# Patient Record
Sex: MALE | Race: WHITE | Employment: FULL TIME | URBAN - METROPOLITAN AREA
[De-identification: names, ages, dates, MRNs, and addresses within clinical notes are randomized per-mention and may not be internally consistent; named-entity substitution may affect disease eponyms.]

---

## 2017-11-14 ENCOUNTER — HOSPITAL ENCOUNTER (EMERGENCY)
Facility: CLINIC | Age: 42
Discharge: HOME | End: 2017-11-14
Attending: EMERGENCY MEDICINE | Admitting: EMERGENCY MEDICINE
Payer: COMMERCIAL

## 2017-11-14 VITALS
OXYGEN SATURATION: 98 % | BODY MASS INDEX: 28.56 KG/M2 | TEMPERATURE: 98.4 F | SYSTOLIC BLOOD PRESSURE: 154 MMHG | WEIGHT: 204 LBS | RESPIRATION RATE: 18 BRPM | DIASTOLIC BLOOD PRESSURE: 100 MMHG | HEIGHT: 71 IN

## 2017-11-14 DIAGNOSIS — F32.A DEPRESSION, UNSPECIFIED DEPRESSION TYPE: ICD-10-CM

## 2017-11-14 DIAGNOSIS — F41.9 ANXIETY: ICD-10-CM

## 2017-11-14 PROCEDURE — 99283 EMERGENCY DEPT VISIT LOW MDM: CPT

## 2017-11-14 PROCEDURE — 25000132 ZZH RX MED GY IP 250 OP 250 PS 637: Performed by: EMERGENCY MEDICINE

## 2017-11-14 RX ORDER — LORAZEPAM 2 MG/1
2 TABLET ORAL ONCE
Status: COMPLETED | OUTPATIENT
Start: 2017-11-14 | End: 2017-11-14

## 2017-11-14 RX ORDER — LORAZEPAM 0.5 MG/1
1 TABLET ORAL ONCE
Status: DISCONTINUED | OUTPATIENT
Start: 2017-11-14 | End: 2017-11-14

## 2017-11-14 RX ORDER — CLONAZEPAM 1 MG/1
0.5-1 TABLET ORAL 2 TIMES DAILY PRN
Qty: 20 TABLET | Refills: 0 | Status: SHIPPED | OUTPATIENT
Start: 2017-11-14

## 2017-11-14 RX ADMIN — LORAZEPAM 2 MG: 2 TABLET ORAL at 13:46

## 2017-11-14 ASSESSMENT — ENCOUNTER SYMPTOMS
SLEEP DISTURBANCE: 1
DECREASED CONCENTRATION: 1
NERVOUS/ANXIOUS: 1

## 2017-11-14 NOTE — ED AVS SNAPSHOT
Emergency Department    [address] [facility] [address]    Phone:  [phone]    Fax:  [phone]                                       Spencer Kiser   MRN: [id number]    Department:   Emergency Department   Date of Visit:  11/14/2017           After Visit Summary Signature Page     I have received my discharge instructions, and my questions have been answered. I have discussed any challenges I see with this plan with the nurse or doctor.    ..........................................................................................................................................  Patient/Patient Representative Signature      ..........................................................................................................................................  Patient Representative Print Name and Relationship to Patient    ..................................................               ................................................  Date                                            Time    ..........................................................................................................................................  Reviewed by Signature/Title    ...................................................              ..............................................  Date                                                            Time

## 2017-11-14 NOTE — ED PROVIDER NOTES
"  History     Chief Complaint:  Depression     HPI   Spencer Kiser is a 42 year old male with a history of anxiety and depression who presents to the emergency department today accompanied by his wife for evaluation of depression. The patient states he has been experiencing \"bad anxiety.\" He has been on Seroquel for 10 years and this normally helps him fall asleep, but it has not provided any relief  recently. He has been experiencing loss of sleep, loss of motivation, and loss of concentration. He says he \"thinks about suicide but would never do it\" and has no active plan. He regularly sees a psychiatric nurse practitioner for depression management. Within the past month, he had switched medications for depression and anxiety 3-4 times due to insurance problems, but has noticed no relief on the new medications.  He could not recall the name of the medication he is currently on, but states he has been taking it for 2 months. Within the past 1.5 years, he had moved to [place] from [place], started a new job, and experienced many stressors in his personal life.     Allergies:  Drug allergies reviewed. No pertinent drug allergies.     Medications:    Seroquel  Depression medications     Past Medical History:    Depression  Anxiety    Past Surgical History:    Past surgical history reviewed. No pertinent past surgical hist     Family History:    Family history reviewed. No pertinent family history.     Social History:  The patient was accompanied to the ED by wife.  Marital Status:     Review of Systems   Psychiatric/Behavioral: Positive for decreased concentration, sleep disturbance and suicidal ideas. The patient is nervous/anxious.    All other systems reviewed and are negative.    Physical Exam     Patient Vitals for the past 24 hrs:   BP Temp Temp src Heart Rate Resp SpO2 Height Weight   11/14/17 1222 (!) 154/100 98.4  F (36.9  C) Temporal 81 18 98 % 1.803 m (5' 11\") 92.5 kg (204 lb)     Physical " Exam  General/Appearance: appears stated age, well-groomed, appears comfortable  Eyes: EOMI, no scleral injection, no icterus  ENT: MMM  Neck: supple, nl ROM, no stiffness  Cardiovascular: RRR, nl S1S2, no m/r/g, 2+ pulses in all 4 extremities, cap refill <2sec  Respiratory: CTAB, good air movement throughout, no wheezes/rhonchi/rales, no increased WOB, no retractions  Back: no lesions  GI: abd soft, non-distended, nttp,  no HSM, no rebound, no guarding, nl BS  MSK: KOHLI, good tone, no bony abnormality  Skin: warm and well-perfused, no rash, no edema, no ecchymosis, nl turgor  Neuro: GCS 15, alert and oriented, no gross focal neuro deficits  Psych: Appearance: Casually dressed, appears stated age.     Attitude: Cooperative.     Eye Contact: Fair.     Speech: Regular rate rhythm normal volume and tone    Psychomotor Behavior: Normal    Mood: depressed and anxious    Affect: mildly flat    Thought Process: Intact    Thought Content: passive SI. - HI. - paranoia. - hallucinations    Insight: Intact    Judgment: Intact     Oriented to: Person place and time.     Attention Span and Concentration: Intact     Recent and Remote Memory: Intact  Heme: no petechia, no purpura, no active bleeding    Emergency Department Course     Interventions:  1346 Ativan 2 mg PO     Emergency Department Course:    Nursing notes and vitals reviewed.    1310 I performed an exam of the patient as documented above.     I discussed the treatment plan with the patient and his wife. They expressed understanding of this plan and consented to discharge. They will be discharged home with instructions for care and follow up. In addition, the patient will return to the emergency department if their symptoms persist, worsen, if new symptoms arise or if there is any concern.  All questions were answered.     Impression & Plan      Medical Decision Making:  Spencer Kiser is a 42 year old male who presents to the emergency department today for  evaluation of increasing anxiety and depression. Multiple medications have been changed recently in the new ones, Zyprexa, are not working as well. It turns out that initially he reported that he is on Seroquel every night but it turns out he is only taking half of the Seroquel tablet as he has to pay out of pocket for it. I suspect this lack of sleep is definitely contributing to his worsening mood. He already has Xanax, that he was able to inform us later however I will supply a few clonazepam to see if that helps even out his anxiety. Although he's has passive suicidal thoughts he has no active thoughts or ideation. I feel that he is currently safe for discharge and outpatient management. He has a therapy appointment set up for tomorrow. Both I and the nurse encouraged him to call to see one of the psychiatrists to continue to trial different medications.   Diagnosis:    ICD-10-CM    1. Depression, unspecified depression type F32.9    2. Anxiety F41.9      Disposition:   The patient is discharged to home.     Discharge Medications:  New Prescriptions    CLONAZEPAM (KLONOPIN) 1 MG TABLET    Take 0.5-1 tablets (0.5-1 mg) by mouth 2 times daily as needed for anxiety     Scribe Disclosure:  I, Marie Matthews, am serving as a scribe at 1:13 PM on 11/14/2017 to document services personally performed by Dorcas Solis MD, based on my observations and the provider's statements to me.       EMERGENCY DEPARTMENT       Dorcas Solis MD  11/14/17 [id number]

## 2017-11-14 NOTE — DISCHARGE INSTRUCTIONS
Treating Anxiety Disorders with Therapy    If you have an anxiety disorder, you don t have to suffer anymore. Treatment is available. Therapy (also called counseling) is often a helpful treatment for anxiety disorders. With therapy, a specially trained professional (therapist) helps you face and learn to manage your anxiety. Therapy can be short-term or long-term depending on your needs. In some cases, medicine may also be prescribed with therapy. It may take time before you notice how much therapy is helping, but stick with it. With therapy, you can feel better.  Cognitive behavioral therapy (CBT)  Cognitive behavioral therapy (CBT) teaches you to manage anxiety. It does this by helping you understand how you think and act when you re anxious. Research has shown CBT to be a very effective treatment for anxiety disorders. How CBT is run is almost like a class. It involves homework and activities to build skills that teach you to cope with anxiety step by step. It can be done in a group or one-on-one, and often takes place for a set number of sessions. CBT has two main parts:    Cognitive therapy helps you identify the negative, irrational thoughts that occur with your anxiety. You ll learn to replace these with more positive, realistic thoughts.    Behavioral therapy helps you change how you react to anxiety. You ll learn coping skills and methods for relaxing to help you better deal with anxiety.  Other forms of therapy  Other therapy methods may work better for you than CBT. Or, you may move from CBT to another form of therapy as your treatment needs change. This may mean meeting with a therapist by yourself or in a group. Therapy can also help you work through problems in your life, such as drug or alcohol dependence, that may be making your anxiety worse.  Getting better takes time  Therapy will help you feel better and teach you skills to help manage anxiety long term. But change doesn t happen right away. It  takes a commitment from you. And treatment only works if you learn to face the causes of your anxiety. So, you might feel worse before you feel better. This can sometimes make it hard to stick with it. But remember: Therapy is a very effective treatment. The results will be well worth it.  Helping yourself  If anxiety is wearing you down, here are some things you can do to cope:    Check with your doctor and rule out any physical problems that may be causing the anxiety symptoms.    If an anxiety disorder is diagnosed, seek mental healthcare. This is an illness and it can respond to treatment. Most types of anxiety disorders will respond to talk therapy and medicine.    Educate yourself about anxiety disorders. Keep track of helpful online resources and books you can use during stressful periods.    Try stress management techniques such as meditation.    Consider online or in-person support groups.    Don t fight your feelings. Anxiety feeds itself. The more you worry about it, the worse it gets. Instead, try to identify what might have triggered your anxiety. Then try to put this threat in perspective.    Keep in mind that you can t control everything about a situation. Change what you can and let the rest take its course.    Exercise -- it s a great way to relieve tension and help your body feel relaxed.    Examine your life for stress, and try to find ways to reduce it.    Avoid caffeine and nicotine, which can make anxiety symptoms worse.    Fight the temptation to turn to alcohol or unprescribed drugs for relief. They only make things worse in the long run.   Date Last Reviewed: 1/1/2017 2000-2017 [employer]. [address] [facility], [place] [address]. All rights reserved. This information is not intended as a substitute for professional medical care. Always follow your healthcare professional's instructions.          Treating Anxiety Disorders with Medicine  An anxiety disorder can make you  feel nervous or apprehensive, even without a clear reason. In people age 65 and older, generalized anxiety disorder is one of the most commonly diagnosed anxiety disorders. Many times it occurs with depression. Certain anxiety disorders can cause intense feelings of fear or panic. You may even have physical symptoms such as a racing heartbeat, sweating, or dizziness. If you have these feelings, you don t have to suffer anymore. Treatment to help you overcome your fears will likely include therapy (also called counseling). Medicine may also be prescribed to help control your symptoms.    Medicines  Certain medicines may be prescribed to help control your symptoms. So you may feel less anxious. You may also feel able to move forward with therapy. At first, medicines and dosages may need to be adjusted to find what works best for you. Try to be patient. Tell your healthcare provider how a medicine makes you feel. This way, you can work together to find the treatment that s best for you. Keep in mind that medicines can have side effects. Talk with your provider about any side effects that are bothering you. Changing the dose or type of medicine may help. Don t stop taking medicine on your own. That can cause symptoms to come back.    Anti-anxiety medicine. This medicine eases symptoms and helps you relax. Your healthcare provider will explain when and how to use it. It may be prescribed for use before situations that make you anxious. You may also be told to take medicine on a regular schedule. Anti-anxiety medicine may make you feel a little sleepy or  out of it.  Don t drive a car or operate machinery while on this medicine, until you know how it affects you.  Caution  Never use alcohol or other drugs with anti-anxiety medicines. This could result in loss of muscular control, sedation, coma, or death. Also, use only the amount of medicine prescribed for you. If you think you may have taken too much, get emergency care  right away.     Antidepressant medicine. This kind of medicine is often used to treat anxiety, even if you aren t depressed. An antidepressant helps balance out brain chemicals. This helps keep anxiety under control. This medicine is taken on a schedule. It takes a few weeks to start working. If you don t notice a change at first, you may just need more time. But if you don t notice results after the first few weeks, tell your provider.  Keep taking medicines as prescribed  Never change your dosage, share or use another person's medicine, or stop taking your medicines without talking to your healthcare provider first. Keep the following in mind:    Some medicines must be taken on a schedule. Make this part of your daily routine. For instance, always take your pill before brushing your teeth. A pillbox can help you remember if you ve taken your medicine each day.    Medicines are often taken for 6 to 12 months. Your healthcare provider will then evaluate whether you need to stay on them. Many people who have also had therapy may no longer need medicine to manage anxiety.    You may need to stop taking medicine slowly to give your body time to adjust. When it s time to stop, your healthcare provider will tell you more. Remember: Never stop taking your medicine without talking to your provider first.    If symptoms return, you may need to start taking medicines again. This isn t your fault. It s just the nature of your anxiety disorder.  Special concerns    Side effects. Medicines may cause side effects. Ask your healthcare provider or pharmacist what you can expect. They may have ideas for avoiding some side effects.    Sexual problems. Some antidepressants can affect your desire for sex or your ability to have an orgasm. A change in dosage or medicine often solves the problem. If you have a sexual side effect that concerns you, tell your healthcare provider.    Addiction. If you ve never had a problem with drugs or  alcohol, you may not have a problem with medicines used to treat anxiety disorders. But always discuss the medicines with your healthcare provider before taking them. If you have a history of addiction, you may not be able to use certain medicines used to treat anxiety disorders.    Medicine interactions. Always check with your pharmacist before using any over-the-counter medicines, including herbal supplements.   Date Last Reviewed: 5/1/2017 2000-2017 [employer]. [address] [place] [address], [place] [address]. All rights reserved. This information is not intended as a substitute for professional medical care. Always follow your healthcare professional's instructions.          Depression  Depression is one of the most common mental health problems today. It is not just a state of unhappiness or sadness. It is a true disease. The cause seems to be related to a decrease in chemicals that transmit signals in the brain. Having a family history of depression, alcoholism, or suicide increases the risk. Chronic illness, chronic pain, migraine headaches and high emotional stress also increase the risk.  Depression is something we tend to recognize in others, but may have a hard time seeing in ourselves. It can show in many physical and emotional ways:    Loss of appetite    Over-eating    Not being able to sleep    Sleeping too much    Tiredness not related to physical exertion    Restlessness or irritability    Slowness of movement or speech    Feeling depressed or withdrawn    Loss of interest in things you once enjoyed    Trouble concentrating, poor memory, trouble making decisions    Thoughts of harming or killing oneself, or thoughts that life is not worth living    Low self-esteem  The treatment for depression may include both medicine and psychotherapy. Antidepressants can reduce suffering and can improve the ability to function during the depressed period. Therapy can offer emotional support and help  you understand emotional factors that may be causing the depression.  Home care    On-going care and support helps people manage this disease.  Find a healthcare provider and therapist who meet your needs. Seek help when you feel like you may be getting ill.    Be kind to yourself. Make it a point to do things that you enjoy (gardening, walking in nature, going to a movie, etc.). Reward yourself for small successes.    Take care of your physical body. Eat a balanced diet (low in saturated fat and high in fruits and vegetables). Exercise at least 3 times a week for 30 minutes. Even mild-moderate exercise (like brisk walking) can make you feel better.    Avoid alcohol, which can make depression worse.    Take medicine as prescribed.    Tell each of your healthcare providers about all of the prescription drugs, over-the-counter medicines, vitamins, and supplements you take. Certain supplements interact with medicines and can result in dangerous side effects. Ask your pharmacist when you have questions about drug interactions.    Talk with your family and trusted friends about your feelings and thoughts. Ask them to help you recognize behavior changes early so you can get help and, if needed, medicine can be adjusted.  Follow-up care  Follow up with your healthcare provider, or as advised.  Call 911  Call 911 if you:    Have suicidal thoughts, a suicide plan, and the means to carry out the plan    Have trouble breathing    Are very confused    Feel very drowsy or have trouble awakening    Faint or lose consciousness    Have new chest pain that becomes more severe, lasts longer, or spreads into your shoulder, arm, neck, jaw or back  When to seek medical advice  Call your healthcare provider right away if any of these occur:    Feeling extreme depression, fear, anxiety, or anger toward yourself or others    Feeling out of control    Feeling that you may try to harm yourself or another    Hearing voices that others do not  hear    Seeing things that others do not see    Can t sleep or eat for 3 days in a row    Friends or family express concern over your behavior and ask you to seek help  Date Last Reviewed: 9/29/2015 2000-2017 [employer]. [address] [place] [address], [place] [address]. All rights reserved. This information is not intended as a substitute for professional medical care. Always follow your healthcare professional's instructions.

## 2017-11-14 NOTE — ED AVS SNAPSHOT
Emergency Department    [address] [facility] [address]    Phone:  [phone]    Fax:  [phone]                                       Spencer Kiser   MRN: [id number]    Department:   Emergency Department   Date of Visit:  11/14/2017           Patient Information     Date Of Birth          1975        Your diagnoses for this visit were:     Depression, unspecified depression type     Anxiety        You were seen by Dorcas Solis MD.      Follow-up Information     Follow up with Follow-up with your Psychiatry office and Psychologist .        Follow up with  Emergency Department.    Specialty:  EMERGENCY MEDICINE    Why:  As needed, If symptoms worsen    Contact information:    [address] [facility] [address] [phone]        Discharge Instructions         Treating Anxiety Disorders with Therapy    If you have an anxiety disorder, you don t have to suffer anymore. Treatment is available. Therapy (also called counseling) is often a helpful treatment for anxiety disorders. With therapy, a specially trained professional (therapist) helps you face and learn to manage your anxiety. Therapy can be short-term or long-term depending on your needs. In some cases, medicine may also be prescribed with therapy. It may take time before you notice how much therapy is helping, but stick with it. With therapy, you can feel better.  Cognitive behavioral therapy (CBT)  Cognitive behavioral therapy (CBT) teaches you to manage anxiety. It does this by helping you understand how you think and act when you re anxious. Research has shown CBT to be a very effective treatment for anxiety disorders. How CBT is run is almost like a class. It involves homework and activities to build skills that teach you to cope with anxiety step by step. It can be done in a group or one-on-one, and often takes place for a set number of sessions. CBT has two main parts:    Cognitive  therapy helps you identify the negative, irrational thoughts that occur with your anxiety. You ll learn to replace these with more positive, realistic thoughts.    Behavioral therapy helps you change how you react to anxiety. You ll learn coping skills and methods for relaxing to help you better deal with anxiety.  Other forms of therapy  Other therapy methods may work better for you than CBT. Or, you may move from CBT to another form of therapy as your treatment needs change. This may mean meeting with a therapist by yourself or in a group. Therapy can also help you work through problems in your life, such as drug or alcohol dependence, that may be making your anxiety worse.  Getting better takes time  Therapy will help you feel better and teach you skills to help manage anxiety long term. But change doesn t happen right away. It takes a commitment from you. And treatment only works if you learn to face the causes of your anxiety. So, you might feel worse before you feel better. This can sometimes make it hard to stick with it. But remember: Therapy is a very effective treatment. The results will be well worth it.  Helping yourself  If anxiety is wearing you down, here are some things you can do to cope:    Check with your doctor and rule out any physical problems that may be causing the anxiety symptoms.    If an anxiety disorder is diagnosed, seek mental healthcare. This is an illness and it can respond to treatment. Most types of anxiety disorders will respond to talk therapy and medicine.    Educate yourself about anxiety disorders. Keep track of helpful online resources and books you can use during stressful periods.    Try stress management techniques such as meditation.    Consider online or in-person support groups.    Don t fight your feelings. Anxiety feeds itself. The more you worry about it, the worse it gets. Instead, try to identify what might have triggered your anxiety. Then try to put this threat  in perspective.    Keep in mind that you can t control everything about a situation. Change what you can and let the rest take its course.    Exercise -- it s a great way to relieve tension and help your body feel relaxed.    Examine your life for stress, and try to find ways to reduce it.    Avoid caffeine and nicotine, which can make anxiety symptoms worse.    Fight the temptation to turn to alcohol or unprescribed drugs for relief. They only make things worse in the long run.   Date Last Reviewed: 1/1/2017 2000-2017 [employer]. [address] [place] [address]. All rights reserved. This information is not intended as a substitute for professional medical care. Always follow your healthcare professional's instructions.          Treating Anxiety Disorders with Medicine  An anxiety disorder can make you feel nervous or apprehensive, even without a clear reason. In people age 65 and older, generalized anxiety disorder is one of the most commonly diagnosed anxiety disorders. Many times it occurs with depression. Certain anxiety disorders can cause intense feelings of fear or panic. You may even have physical symptoms such as a racing heartbeat, sweating, or dizziness. If you have these feelings, you don t have to suffer anymore. Treatment to help you overcome your fears will likely include therapy (also called counseling). Medicine may also be prescribed to help control your symptoms.    Medicines  Certain medicines may be prescribed to help control your symptoms. So you may feel less anxious. You may also feel able to move forward with therapy. At first, medicines and dosages may need to be adjusted to find what works best for you. Try to be patient. Tell your healthcare provider how a medicine makes you feel. This way, you can work together to find the treatment that s best for you. Keep in mind that medicines can have side effects. Talk with your provider about any side effects that are  bothering you. Changing the dose or type of medicine may help. Don t stop taking medicine on your own. That can cause symptoms to come back.    Anti-anxiety medicine. This medicine eases symptoms and helps you relax. Your healthcare provider will explain when and how to use it. It may be prescribed for use before situations that make you anxious. You may also be told to take medicine on a regular schedule. Anti-anxiety medicine may make you feel a little sleepy or  out of it.  Don t drive a car or operate machinery while on this medicine, until you know how it affects you.  Caution  Never use alcohol or other drugs with anti-anxiety medicines. This could result in loss of muscular control, sedation, coma, or death. Also, use only the amount of medicine prescribed for you. If you think you may have taken too much, get emergency care right away.     Antidepressant medicine. This kind of medicine is often used to treat anxiety, even if you aren t depressed. An antidepressant helps balance out brain chemicals. This helps keep anxiety under control. This medicine is taken on a schedule. It takes a few weeks to start working. If you don t notice a change at first, you may just need more time. But if you don t notice results after the first few weeks, tell your provider.  Keep taking medicines as prescribed  Never change your dosage, share or use another person's medicine, or stop taking your medicines without talking to your healthcare provider first. Keep the following in mind:    Some medicines must be taken on a schedule. Make this part of your daily routine. For instance, always take your pill before brushing your teeth. A pillbox can help you remember if you ve taken your medicine each day.    Medicines are often taken for 6 to 12 months. Your healthcare provider will then evaluate whether you need to stay on them. Many people who have also had therapy may no longer need medicine to manage anxiety.    You may need to  stop taking medicine slowly to give your body time to adjust. When it s time to stop, your healthcare provider will tell you more. Remember: Never stop taking your medicine without talking to your provider first.    If symptoms return, you may need to start taking medicines again. This isn t your fault. It s just the nature of your anxiety disorder.  Special concerns    Side effects. Medicines may cause side effects. Ask your healthcare provider or pharmacist what you can expect. They may have ideas for avoiding some side effects.    Sexual problems. Some antidepressants can affect your desire for sex or your ability to have an orgasm. A change in dosage or medicine often solves the problem. If you have a sexual side effect that concerns you, tell your healthcare provider.    Addiction. If you ve never had a problem with drugs or alcohol, you may not have a problem with medicines used to treat anxiety disorders. But always discuss the medicines with your healthcare provider before taking them. If you have a history of addiction, you may not be able to use certain medicines used to treat anxiety disorders.    Medicine interactions. Always check with your pharmacist before using any over-the-counter medicines, including herbal supplements.   Date Last Reviewed: 5/1/2017 2000-2017 [employer]. [address] [place] [address]. All rights reserved. This information is not intended as a substitute for professional medical care. Always follow your healthcare professional's instructions.          Depression  Depression is one of the most common mental health problems today. It is not just a state of unhappiness or sadness. It is a true disease. The cause seems to be related to a decrease in chemicals that transmit signals in the brain. Having a family history of depression, alcoholism, or suicide increases the risk. Chronic illness, chronic pain, migraine headaches and high emotional stress also  increase the risk.  Depression is something we tend to recognize in others, but may have a hard time seeing in ourselves. It can show in many physical and emotional ways:    Loss of appetite    Over-eating    Not being able to sleep    Sleeping too much    Tiredness not related to physical exertion    Restlessness or irritability    Slowness of movement or speech    Feeling depressed or withdrawn    Loss of interest in things you once enjoyed    Trouble concentrating, poor memory, trouble making decisions    Thoughts of harming or killing oneself, or thoughts that life is not worth living    Low self-esteem  The treatment for depression may include both medicine and psychotherapy. Antidepressants can reduce suffering and can improve the ability to function during the depressed period. Therapy can offer emotional support and help you understand emotional factors that may be causing the depression.  Home care    On-going care and support helps people manage this disease.  Find a healthcare provider and therapist who meet your needs. Seek help when you feel like you may be getting ill.    Be kind to yourself. Make it a point to do things that you enjoy (gardening, walking in nature, going to a movie, etc.). Reward yourself for small successes.    Take care of your physical body. Eat a balanced diet (low in saturated fat and high in fruits and vegetables). Exercise at least 3 times a week for 30 minutes. Even mild-moderate exercise (like brisk walking) can make you feel better.    Avoid alcohol, which can make depression worse.    Take medicine as prescribed.    Tell each of your healthcare providers about all of the prescription drugs, over-the-counter medicines, vitamins, and supplements you take. Certain supplements interact with medicines and can result in dangerous side effects. Ask your pharmacist when you have questions about drug interactions.    Talk with your family and trusted friends about your feelings and  thoughts. Ask them to help you recognize behavior changes early so you can get help and, if needed, medicine can be adjusted.  Follow-up care  Follow up with your healthcare provider, or as advised.  Call 911  Call 911 if you:    Have suicidal thoughts, a suicide plan, and the means to carry out the plan    Have trouble breathing    Are very confused    Feel very drowsy or have trouble awakening    Faint or lose consciousness    Have new chest pain that becomes more severe, lasts longer, or spreads into your shoulder, arm, neck, jaw or back  When to seek medical advice  Call your healthcare provider right away if any of these occur:    Feeling extreme depression, fear, anxiety, or anger toward yourself or others    Feeling out of control    Feeling that you may try to harm yourself or another    Hearing voices that others do not hear    Seeing things that others do not see    Can t sleep or eat for 3 days in a row    Friends or family express concern over your behavior and ask you to seek help  Date Last Reviewed: 9/29/2015 2000-2017 [employer]. [address] [place] [address]. All rights reserved. This information is not intended as a substitute for professional medical care. Always follow your healthcare professional's instructions.          24 Hour Appointment Hotline       To make an appointment at any [facility], call [phone] ([phone]). If you don't have a family doctor or clinic, we will help you find one. [facility] clinics are conveniently located to serve the needs of you and your family.             Review of your medicines      START taking        Dose / Directions Last dose taken    clonazePAM 1 MG tablet   Commonly known as:  klonoPIN   Dose:  0.5-1 mg   Quantity:  20 tablet        Take 0.5-1 tablets (0.5-1 mg) by mouth 2 times daily as needed for anxiety   Refills:  0                Prescriptions were sent or printed at these locations (1 Prescription)                    Other Prescriptions                Printed at Department/Unit printer (1 of 1)         clonazePAM (KLONOPIN) 1 MG tablet                Orders Needing Specimen Collection     None      Pending Results     No orders found from 11/12/2017 to 11/15/2017.            Pending Culture Results     No orders found from 11/12/2017 to 11/15/2017.            Pending Results Instructions     If you had any lab results that were not finalized at the time of your Discharge, you can call the ED Lab Result RN at [phone]. You will be contacted by this team for any positive Lab results or changes in treatment. The nurses are available 7 days a week from 10A to 6:30P.  You can leave a message 24 hours per day and they will return your call.        Test Results From Your Hospital Stay               Clinical Quality Measure: Blood Pressure Screening     Your blood pressure was checked while you were in the emergency department today. The last reading we obtained was  BP: (!) 154/100 . Please read the guidelines below about what these numbers mean and what you should do about them.  If your systolic blood pressure (the top number) is less than 120 and your diastolic blood pressure (the bottom number) is less than 80, then your blood pressure is normal. There is nothing more that you need to do about it.  If your systolic blood pressure (the top number) is 120-139 or your diastolic blood pressure (the bottom number) is 80-89, your blood pressure may be higher than it should be. You should have your blood pressure rechecked within a year by a primary care provider.  If your systolic blood pressure (the top number) is 140 or greater or your diastolic blood pressure (the bottom number) is 90 or greater, you may have high blood pressure. High blood pressure is treatable, but if left untreated over time it can put you at risk for heart attack, stroke, or kidney failure. You should have your blood pressure rechecked by a  "primary care provider within the next 4 weeks.  If your provider in the emergency department today gave you specific instructions to follow-up with your doctor or provider even sooner than that, you should follow that instruction and not wait for up to 4 weeks for your follow-up visit.        Thank you for choosing [facility]       Thank you for choosing [facility] for your care. Our goal is always to provide you with excellent care. Hearing back from our patients is one way we can continue to improve our services. Please take a few minutes to complete the written survey that you may receive in the mail after you visit with us. Thank you!        [employer] Information     [employer] lets you send messages to your doctor, view your test results, renew your prescriptions, schedule appointments and more. To sign up, go to [email or web address] . Click on \"Log in\" on the left side of the screen, which will take you to the Welcome page. Then click on \"Sign up Now\" on the right side of the page.     You will be asked to enter the access code listed below, as well as some personal information. Please follow the directions to create your username and password.     Your access code is: [email or web address]  Expires: 2018  2:52 PM     Your access code will  in 90 days. If you need help or a new code, please call your [facility] clinic or [phone].        Care EveryWhere ID     This is your Care EveryWhere ID. This could be used by other organizations to access your [facility] medical records  [id number]        Equal Access to Services     JOSE J WOOD : Hadii shereen Weston, waaxda luanshuladaha, qaybta kaalmada razia, edilberto prado . So [facility] [phone].    ATENCIÓN: Si habla español, tiene a wilks disposición servicios gratuitos de asistencia lingüística. Llame al [phone].    We comply with applicable federal civil rights laws and [place] laws. We do not discriminate on the basis of " race, color, national origin, age, disability, sex, sexual orientation, or gender identity.            After Visit Summary       This is your record. Keep this with you and show to your community pharmacist(s) and doctor(s) at your next visit.

## 2021-04-24 ENCOUNTER — OFFICE VISIT (OUTPATIENT)
Dept: ORTHOPEDICS | Facility: CLINIC | Age: 46
End: 2021-04-24
Payer: COMMERCIAL

## 2021-04-24 ENCOUNTER — ANCILLARY PROCEDURE (OUTPATIENT)
Dept: GENERAL RADIOLOGY | Facility: CLINIC | Age: 46
End: 2021-04-24
Attending: STUDENT IN AN ORGANIZED HEALTH CARE EDUCATION/TRAINING PROGRAM
Payer: COMMERCIAL

## 2021-04-24 VITALS
SYSTOLIC BLOOD PRESSURE: 117 MMHG | DIASTOLIC BLOOD PRESSURE: 76 MMHG | WEIGHT: 190 LBS | BODY MASS INDEX: 25.73 KG/M2 | HEIGHT: 72 IN

## 2021-04-24 DIAGNOSIS — M79.641 RIGHT HAND PAIN: ICD-10-CM

## 2021-04-24 DIAGNOSIS — M72.0 DUPUYTREN'S CONTRACTURE OF RIGHT HAND: Primary | ICD-10-CM

## 2021-04-24 PROCEDURE — 99203 OFFICE O/P NEW LOW 30 MIN: CPT | Performed by: STUDENT IN AN ORGANIZED HEALTH CARE EDUCATION/TRAINING PROGRAM

## 2021-04-24 PROCEDURE — 73130 X-RAY EXAM OF HAND: CPT | Mod: RT | Performed by: RADIOLOGY

## 2021-04-24 ASSESSMENT — MIFFLIN-ST. JEOR: SCORE: 1771.89

## 2021-04-24 NOTE — PROGRESS NOTES
"ASSESSMENT & PLAN    1. Right hand pain  2. Dupuytren's contracture of right hand  - XR Hand Right G/E 3 Views; Future  - Orthopedic & Spine  Referral; Future     History and exam consistent with Dupuytren's contracture of the right fifth digit.  Hand x-rays obtained today and reviewed with patient, unremarkable.  We discussed the nature of his condition and available treatment options, patient is primarily concerned with recent progression and definitive management.  Referral to hand surgery placed, follow-up with me as needed going forward.    Jabier Degroot MD  [facility]    -----  Chief Complaint   Patient presents with     Right Little Finger - Pain       SUBJECTIVE  [place] Ian Kiser is a/an 45 year old male who is seen as a self referral for evaluation of  Right little finger pain.     The patient is seen by themselves.  The patient is Left handed    Date of Onset: 6 month. Reports insidious onset without acute precipitating event. He thinks he may have injured it catching hockey pucks.   Location of Pain: constant, throbbing pain right little finger, notable nodule at base of 5th finger.  Worsened by: activity, sports, yardwork   Better with: nothing  Treatments tried: stretching  Associated symptoms: swelling - palpable nodule, locking, stiffness/limited finger extension     Orthopedic/Surgical history: NO  Social History/Occupation:      No family history pertinent to patient's problem today.       OBJECTIVE:  /76   Ht 1.816 m (5' 11.5\")   Wt 86.2 kg (190 lb)   BMI 26.13 kg/m     General: healthy, alert and in no distress  HEENT: no scleral icterus or conjunctival erythema  Skin: no visible suspicious lesions or rashes.   Resp: normal respiratory effort without conversational dyspnea   Psych: normal mood and affect    MSK:   Right hand with visible fifth digit flexor contracture and nodule just proximal to MCP  Minimal " tenderness around nodule, otherwise and nontender  Patient is able to achieve full extension with minimal discomfort  CMS intact distally    RADIOLOGY:  Xr Hand Right G/e 3 Views    Result Date: 4/24/2021  Examination:  XR HAND RT G/E 3 VW Date:  4/24/2021 10:52 AM Clinical Information: Right hand pain. Comparison: none.     Impression: 1.  Normal right hand. No fracture or joint malalignment. Normal soft tissues. OLIVA PÉREZ MD

## 2021-04-24 NOTE — PATIENT INSTRUCTIONS
You have a Dupuytren's contracture in your hand    A  will call you to set up an appointment with our hand surgeons    Let me know if you have any questions or concerns

## 2021-04-24 NOTE — LETTER
"    4/24/2021         RE: Spencer Kiser  [address] [facility] [address]        Dear Colleague,    Thank you for referring your patient, Spencer Kiser, to the [facility]. Please see a copy of my visit note below.    ASSESSMENT & PLAN    1. Right hand pain  2. Dupuytren's contracture of right hand  - XR Hand Right G/E 3 Views; Future  - Orthopedic & Spine  Referral; Future     History and exam consistent with Dupuytren's contracture of the right fifth digit.  Hand x-rays obtained today and reviewed with patient, unremarkable.  We discussed the nature of his condition and available treatment options, patient is primarily concerned with recent progression and definitive management.  Referral to hand surgery placed, follow-up with me as needed going forward.    Jabier Degroot MD  [facility]    -----  Chief Complaint   Patient presents with     Right Little Finger - Pain       SUBJECTIVE  Spencer Kiser is a/an 45 year old male who is seen as a self referral for evaluation of  Right little finger pain.     The patient is seen by themselves.  The patient is Left handed    Date of Onset: 6 month. Reports insidious onset without acute precipitating event. He thinks he may have injured it catching hockey pucks.   Location of Pain: constant, throbbing pain right little finger, notable nodule at base of 5th finger.  Worsened by: activity, sports, yardwork   Better with: nothing  Treatments tried: stretching  Associated symptoms: swelling - palpable nodule, locking, stiffness/limited finger extension     Orthopedic/Surgical history: NO  Social History/Occupation:      No family history pertinent to patient's problem today.       OBJECTIVE:  /76   Ht 1.816 m (5' 11.5\")   Wt 86.2 kg (190 lb)   BMI 26.13 kg/m     General: healthy, alert and in no distress  HEENT: no scleral icterus or " conjunctival erythema  Skin: no visible suspicious lesions or rashes.   Resp: normal respiratory effort without conversational dyspnea   Psych: normal mood and affect    MSK:   Right hand with visible fifth digit flexor contracture and nodule just proximal to MCP  Minimal tenderness around nodule, otherwise and nontender  Patient is able to achieve full extension with minimal discomfort  CMS intact distally    RADIOLOGY:  Xr Hand Right G/e 3 Views    Result Date: 4/24/2021  Examination:  XR HAND RT G/E 3 VW Date:  4/24/2021 10:52 AM Clinical Information: Right hand pain. Comparison: none.     Impression: 1.  Normal right hand. No fracture or joint malalignment. Normal soft tissues. OLIVA PÉREZ MD         Again, thank you for allowing me to participate in the care of your patient.        Sincerely,        Jabier Degroot MD

## 2021-04-30 NOTE — TELEPHONE ENCOUNTER
RECORDS RECEIVED FROM: Dupuytren's right 5th digit/XR/Jabier Degroot MD/P1/ortho con   DATE RECEIVED: Jul 14, 2021     NOTES STATUS DETAILS   OFFICE NOTE from referring provider Internal  Jabier Degroot MD   OFFICE NOTE from other specialist N/A    DISCHARGE SUMMARY from hospital N/A    DISCHARGE REPORT from the ER N/A    OPERATIVE REPORT N/A    MEDICATION LIST Internal    IMPLANT RECORD/STICKER N/A    LABS     CBC/DIFF N/A    CULTURES N/A    INJECTIONS DONE IN RADIOLOGY N/A    MRI N/A    CT SCAN N/A    XRAYS (IMAGES & REPORTS) Internal    TUMOR     PATHOLOGY  Slides & report N/A      04/30/21   3:05 PM   COMPLETE  Cynthia Kenney, CMA

## 2021-07-14 ENCOUNTER — OFFICE VISIT (OUTPATIENT)
Dept: ORTHOPEDICS | Facility: CLINIC | Age: 46
End: 2021-07-14
Attending: STUDENT IN AN ORGANIZED HEALTH CARE EDUCATION/TRAINING PROGRAM
Payer: COMMERCIAL

## 2021-07-14 ENCOUNTER — PRE VISIT (OUTPATIENT)
Dept: ORTHOPEDICS | Facility: CLINIC | Age: 46
End: 2021-07-14

## 2021-07-14 DIAGNOSIS — M72.0 DUPUYTREN'S CONTRACTURE OF RIGHT HAND: ICD-10-CM

## 2021-07-14 PROCEDURE — 99203 OFFICE O/P NEW LOW 30 MIN: CPT | Mod: GC | Performed by: ORTHOPAEDIC SURGERY

## 2021-07-14 NOTE — LETTER
7/14/2021         RE: Spencer Kiser  [address] [facility] [address]        Dear Colleague,    Thank you for referring your patient, Spencer Kiser, to the [facility] [place]. Please see a copy of my visit note below.    ORTHOPEDIC HAND SURGERY CONSULTATION    REFERRING PROVIDER: Jabier Degroot    CHIEF COMPLAINT: Right hand pain    HISTORY OF PRESENT ILLNESS:  Spencer Kiser is a 45 year old left-hand dominant male with past medical history of hypertension who presents for evaluation of right hand pain.  The patient reports he developed right hand palmar pain over cord approximately 6 to 8 months ago after he caught a hockey puck.  Patient reports that he has had a nodule over the A1 pulley for at least 5 years and developed a palmar cord approximately 6 to 8 months ago.  He reports pain over the proximal aspect of the cord.  The pain is throbbing and can be present at rest.  The pain is exacerbated by any pressure on the palm including shoveling or pushing motions.  No numbness or tingling in the right hand.  Does not feel that he has cords anywhere else.  No Ledderhose or Peyronie's disease.  No family history of Dupuytren's.    PAST MEDICAL HISTORY:  Past Medical History:   Diagnosis Date     Hypertension        MEDICATIONS:  Current Outpatient Medications   Medication Sig     clonazePAM (KLONOPIN) 1 MG tablet Take 0.5-1 tablets (0.5-1 mg) by mouth 2 times daily as needed for anxiety     No current facility-administered medications for this visit.       PAST SURGICAL HISTORY:  No past surgical history on file.    SOCIAL HISTORY:  Occupation:   Hobbies: Hockey, golf, yard work  Tobacco use: Denies    FAMILY HISTORY:  No family history on file.    REVIEW OF SYSTEMS:  A comprehensive 10 point review of systems was performed and found to be negative except as described in this note.     PHYSICAL EXAM:  Gen: awake, alert, no acute  distress  Resp: NLB on RA  CV: Skin wwp  Right upper Extremity  Skin is intact.  Prominent palmar cord to the small finger with a small Y cord/spiral cord to the ulnar aspect of the ring finger.  Small finger MP joint lacks 5 degrees from full extension.  Full extension is present at the small finger PIP joint.  Ring finger PIP and MP joint achieve full extension.  Large nodule over the cord at the level of the A1 pulley.  Mild swelling is present over the cord proximally, tender to palpation.  The remainder of the cord is not tender to palpation.  Sensation intact to light touch in the median radial and ulnar nerve distributions.  5/5 strength in EPL, FPL, interossei.  Palpable radial pulse.    Left Upper Extremity  Skin is intact. Mild palmar cord to the ring finger.  Ring finger PIP and MP joints achieve full extension.  Sensation intact to light touch in the median radial and ulnar nerve distributions.  5/5 strength in EPL, FPL, interossei.  Palpable radial pulse.    IMAGING:  Right hand x-rays 3 views dated 4/24/2021 are independently reviewed by me and demonstrate no acute bony abnormalities.  No obvious degenerative changes.  No obvious soft tissue abnormalities.     ASSESSMENT:  Flex Kiser is a 45-year-old male with right hand Dupuytren's contracture to the small finger.  He currently has a minimal MP contracture and no contracture at the PIP joint.  We discussed that his cord may be potentially painful due to pressure on the digital nerve.  We discussed nonoperative treatment options including observation and Xiaflex as well as operative treatment such as a palmar fasciectomy.  We do not think that a Xiaflex injection would help with his pain.  We discussed observation versus a palmar fasciectomy and will opt to continue watching the contracture at this time given recovery time after fasciectomy.    PLAN:  -Activities as tolerated  -Recommended using a thicker glove when shoveling to take pressure off of the  hand  -Follow-up in clinic for recheck in 6 months.  Patient is to call if he is developing a further contracture or if he is developing any contracture at the PIP joint.    The patient was seen and discussed with Dr. Churchill.    Yee Delgadillo MD  PGY-4  Orthopaedic Surgery

## 2021-07-14 NOTE — PROGRESS NOTES
ORTHOPEDIC HAND SURGERY CONSULTATION    REFERRING PROVIDER: Jabier Degroot    CHIEF COMPLAINT: Right hand pain    HISTORY OF PRESENT ILLNESS:  Spencer Kiser is a 45 year old left-hand dominant male with past medical history of hypertension who presents for evaluation of right hand pain.  The patient reports he developed right hand palmar pain over cord approximately 6 to 8 months ago after he caught a hockey puck.  Patient reports that he has had a nodule over the A1 pulley for at least 5 years and developed a palmar cord approximately 6 to 8 months ago.  He reports pain over the proximal aspect of the cord.  The pain is throbbing and can be present at rest.  The pain is exacerbated by any pressure on the palm including shoveling or pushing motions.  No numbness or tingling in the right hand.  Does not feel that he has cords anywhere else.  No Ledderhose or Peyronie's disease.  No family history of Dupuytren's.    PAST MEDICAL HISTORY:  Past Medical History:   Diagnosis Date     Hypertension        MEDICATIONS:  Current Outpatient Medications   Medication Sig     clonazePAM (KLONOPIN) 1 MG tablet Take 0.5-1 tablets (0.5-1 mg) by mouth 2 times daily as needed for anxiety     No current facility-administered medications for this visit.       PAST SURGICAL HISTORY:  No past surgical history on file.    SOCIAL HISTORY:  Occupation:   Hobbies: Hockey, golf, yard work  Tobacco use: Denies    FAMILY HISTORY:  No family history on file.    REVIEW OF SYSTEMS:  A comprehensive 10 point review of systems was performed and found to be negative except as described in this note.     PHYSICAL EXAM:  Gen: awake, alert, no acute distress  Resp: NLB on RA  CV: Skin wwp  Right upper Extremity  Skin is intact.  Prominent palmar cord to the small finger with a small Y cord/spiral cord to the ulnar aspect of the ring finger.  Small finger MP joint lacks 5 degrees from full extension.  Full extension is present  at the small finger PIP joint.  Ring finger PIP and MP joint achieve full extension.  Large nodule over the cord at the level of the A1 pulley.  Mild swelling is present over the cord proximally, tender to palpation.  The remainder of the cord is not tender to palpation.  Sensation intact to light touch in the median radial and ulnar nerve distributions.  5/5 strength in EPL, FPL, interossei.  Palpable radial pulse.    Left Upper Extremity  Skin is intact. Mild palmar cord to the ring finger.  Ring finger PIP and MP joints achieve full extension.  Sensation intact to light touch in the median radial and ulnar nerve distributions.  5/5 strength in EPL, FPL, interossei.  Palpable radial pulse.    IMAGING:  Right hand x-rays 3 views dated 4/24/2021 are independently reviewed by me and demonstrate no acute bony abnormalities.  No obvious degenerative changes.  No obvious soft tissue abnormalities.     ASSESSMENT:  Flex Kiser is a 45-year-old male with right hand Dupuytren's contracture to the small finger.  He currently has a minimal MP contracture and no contracture at the PIP joint.  We discussed that his cord may be potentially painful due to pressure on the digital nerve.  We discussed nonoperative treatment options including observation and Xiaflex as well as operative treatment such as a palmar fasciectomy.  We do not think that a Xiaflex injection would help with his pain.  We discussed observation versus a palmar fasciectomy and will opt to continue watching the contracture at this time given recovery time after fasciectomy.    PLAN:  -Activities as tolerated  -Recommended using a thicker glove when shoveling to take pressure off of the hand  -Follow-up in clinic for recheck in 6 months.  Patient is to call if he is developing a further contracture or if he is developing any contracture at the PIP joint.    The patient was seen and discussed with Dr. Churchill.    Yee Delgadillo MD  PGY-4  Orthopaedic  Surgery    Patient was seen and examined with the resident.  I agree with the assessment and plan of care.

## 2021-07-14 NOTE — NURSING NOTE
Reason For Visit:   Chief Complaint   Patient presents with     Consult     right 5th finger dupuytrens, present for years, progressed in th last 6-8 months        Primary MD: [place], [facility]  Ref. MD: Dr.Brain Degroot     ?  No    Age: 45 year old    Occupation Director of [employer].  Currently working? Yes.  Work status?  Full time.  Date of injury: NA  Type of injury: NA.  Date of surgery: NA  Type of surgery:  NA.  Smoker: No  Request smoking cessation information: No      There were no vitals taken for this visit.      Pain Assessment  Patient Currently in Pain: Yes  0-10 Pain Scale: 2 (increases to 5-6/10 with use)    Hand Dominance Evaluation  Hand Dominance: Left (writes with left hand, does everything else with right)          QuickDASH Assessment  No flowsheet data found.       No Known Allergies    Savannah Gibbons, ATC